# Patient Record
Sex: FEMALE | Race: WHITE | NOT HISPANIC OR LATINO | Employment: FULL TIME | URBAN - METROPOLITAN AREA
[De-identification: names, ages, dates, MRNs, and addresses within clinical notes are randomized per-mention and may not be internally consistent; named-entity substitution may affect disease eponyms.]

---

## 2019-10-16 ENCOUNTER — TELEPHONE (OUTPATIENT)
Dept: RHEUMATOLOGY | Facility: CLINIC | Age: 54
End: 2019-10-16

## 2019-10-16 NOTE — TELEPHONE ENCOUNTER
----- Message from Marah Cope sent at 10/16/2019  1:58 PM CDT -----  Contact: [phone]  Patient states that she received a call from [facility], but patient states she does not see an Hyun Lim, and patricio since 2015.

## 2019-10-16 NOTE — TELEPHONE ENCOUNTER
Left message for patient that Samson is gone for the day. I don't see any message of why she called. Please disregard call.